# Patient Record
Sex: FEMALE | Race: BLACK OR AFRICAN AMERICAN | NOT HISPANIC OR LATINO | Employment: PART TIME | URBAN - METROPOLITAN AREA
[De-identification: names, ages, dates, MRNs, and addresses within clinical notes are randomized per-mention and may not be internally consistent; named-entity substitution may affect disease eponyms.]

---

## 2022-07-17 ENCOUNTER — NURSE TRIAGE (OUTPATIENT)
Dept: ADMINISTRATIVE | Facility: CLINIC | Age: 37
End: 2022-07-17

## 2022-07-17 NOTE — TELEPHONE ENCOUNTER
"Diagnosed with Hidradenitis at [facility]    States +abscess near sacrum since last week, +pus & blood drainage x days. Now notes site looks like large red hole with "a lot of bloody drainage & now its begging really big".     Reason for Disposition   Patient sounds very sick or weak to the triager    Additional Information   Negative: [1] Widespread rash AND [2] bright red, sunburn-like AND [3] too weak to stand   Negative: Sounds like a life-threatening emergency to the triager   Negative: Widespread red rash   Negative: Black (necrotic) color or blisters develop in wound    Protocols used: BOIL (SKIN ABSCESS)-A-AH      "